# Patient Record
Sex: FEMALE | Race: WHITE | NOT HISPANIC OR LATINO | Employment: STUDENT | URBAN - METROPOLITAN AREA
[De-identification: names, ages, dates, MRNs, and addresses within clinical notes are randomized per-mention and may not be internally consistent; named-entity substitution may affect disease eponyms.]

---

## 2017-02-10 RX ORDER — CEFDINIR 300 MG/1
300 CAPSULE ORAL EVERY 12 HOURS
COMMUNITY
Start: 2016-12-26 | End: 2017-02-11

## 2017-02-10 RX ORDER — MELOXICAM 7.5 MG/1
7.5 TABLET ORAL DAILY
COMMUNITY
Start: 2016-11-07 | End: 2017-02-11

## 2017-02-10 RX ORDER — FEXOFENADINE HCL AND PSEUDOEPHEDRINE HCI 60; 120 MG/1; MG/1
1 TABLET, EXTENDED RELEASE ORAL 2 TIMES DAILY
COMMUNITY
Start: 2016-12-26 | End: 2017-02-11

## 2017-02-11 ENCOUNTER — OFFICE VISIT (OUTPATIENT)
Dept: NEUROSURGERY | Facility: CLINIC | Age: 22
End: 2017-02-11

## 2017-02-11 VITALS — BODY MASS INDEX: 20.48 KG/M2 | WEIGHT: 127.4 LBS | HEIGHT: 66 IN | TEMPERATURE: 98.4 F

## 2017-02-11 DIAGNOSIS — M50.30 DEGENERATIVE DISC DISEASE, CERVICAL: ICD-10-CM

## 2017-02-11 DIAGNOSIS — G56.21 ULNAR NEUROPATHY OF RIGHT UPPER EXTREMITY: Primary | ICD-10-CM

## 2017-02-11 DIAGNOSIS — M50.30 BULGING OF CERVICAL INTERVERTEBRAL DISC: ICD-10-CM

## 2017-02-11 PROCEDURE — 99243 OFF/OP CNSLTJ NEW/EST LOW 30: CPT | Performed by: NEUROLOGICAL SURGERY

## 2017-02-11 NOTE — PROGRESS NOTES
Patient: Mikaela Fleming  : 1995    Primary Care Provider: Chichi Vo MD    Requesting Provider: As above        History    Chief Complaint: Right arm, hand, and neck pain.    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  This tends to vary between numbness, tingling, and pain.  She has some neck discomfort.  She did 2 weeks of physical therapy which wasn't particularly productive.  She is worse with writing or overuse of the right upper extremity.  Of note she did suffer a fracture near the elbow 14 years ago.  She has no radicular symptoms.  She does have some weakness in her .    Review of Systems   Constitutional: Positive for appetite change. Negative for activity change, chills, diaphoresis, fatigue, fever and unexpected weight change.   HENT: Negative for congestion, dental problem, drooling, ear discharge, ear pain, facial swelling, hearing loss, mouth sores, nosebleeds, postnasal drip, rhinorrhea, sinus pressure, sneezing, sore throat, tinnitus, trouble swallowing and voice change.    Eyes: Negative for photophobia, pain, discharge, redness, itching and visual disturbance.   Respiratory: Negative for apnea, cough, choking, chest tightness, shortness of breath, wheezing and stridor.    Cardiovascular: Negative for chest pain, palpitations and leg swelling.   Gastrointestinal: Positive for abdominal pain. Negative for abdominal distention, anal bleeding, blood in stool, constipation, diarrhea, nausea, rectal pain and vomiting.   Endocrine: Negative for cold intolerance, heat intolerance, polydipsia, polyphagia and polyuria.   Genitourinary: Negative for decreased urine volume, difficulty urinating, dysuria, enuresis, flank pain, frequency, genital sores, hematuria and urgency.   Musculoskeletal: Positive for back pain, neck pain  "and neck stiffness. Negative for arthralgias, gait problem, joint swelling and myalgias.   Skin: Negative for color change, pallor, rash and wound.   Allergic/Immunologic: Negative for environmental allergies, food allergies and immunocompromised state.   Neurological: Positive for weakness and numbness. Negative for dizziness, tremors, seizures, syncope, facial asymmetry, speech difficulty, light-headedness and headaches.   Hematological: Negative for adenopathy. Does not bruise/bleed easily.   Psychiatric/Behavioral: Negative for agitation, behavioral problems, confusion, decreased concentration, dysphoric mood, hallucinations, self-injury, sleep disturbance and suicidal ideas. The patient is not nervous/anxious and is not hyperactive.    All other systems reviewed and are negative.      The patient's past medical history, past surgical history, family history, and social history have been reviewed at length in the electronic medical record.    Physical Exam:   Visit Vitals   • Temp 98.4 °F (36.9 °C)   • Ht 66\" (167.6 cm)   • Wt 127 lb 6.4 oz (57.8 kg)   • BMI 20.56 kg/m2     CONSTITUTIONAL: Patient is well-nourished, pleasant and appears stated age.  CV: Heart regular rate and rhythm without murmur, rub, or gallop.  PULMONARY: Lungs are clear to ascultation.  MUSCULOSKELETAL:  Neck tenderness to palpation is not observed.   ROM in neck is normal.  There is tenderness over the right ulnar groove.  NEUROLOGICAL:  Orientation, memory, attention span, language function, and cognition have been examined and are intact.  Strength is intact in the upper and lower extremities to direct testing.  Muscle tone is normal throughout.  Station and gait are normal.  Sensation is intact to light touch testing throughout.  Deep tendon reflexes are 2+ and symmetrical.  Treasure's Sign is negative bilaterally. No clonus is elicited.  Coordination is intact.      Medical Decision Making    Data Review:   Cervical MRI study " demonstrates some modest degenerative disc disease.  There is a trace amount of disc bulging at C5-6.    Diagnosis:   I suspect that the patient has an ulnar neuropathy.  This potentially could be related to the prior fracture that she suffered a number of years ago.  I do not see evidence of an active cervical process.    Treatment Options:   I have referred the patient for electrodiagnostic studies of the right upper extremity.  She will follow up thereafter.       Diagnosis Plan   1. Ulnar neuropathy of right upper extremity  EMG Right Arm    Nerve Conduction Test Right Arm   2. Bulging of cervical intervertebral disc     3. Degenerative disc disease, cervical         Scribed for John Rosario MD by Billie Medel CMA on 02/11/2017 at 10:39 AM    I, Dr. Rosario, personally performed the services described in the documentation, as scribed in my presence, and it is both accurate and complete.

## 2017-03-03 ENCOUNTER — OFFICE VISIT (OUTPATIENT)
Dept: NEUROSURGERY | Facility: CLINIC | Age: 22
End: 2017-03-03

## 2017-03-03 VITALS — TEMPERATURE: 98.2 F | BODY MASS INDEX: 20.25 KG/M2 | HEIGHT: 66 IN | WEIGHT: 126 LBS

## 2017-03-03 DIAGNOSIS — G56.21 ULNAR NEUROPATHY OF RIGHT UPPER EXTREMITY: Primary | ICD-10-CM

## 2017-03-03 DIAGNOSIS — M50.30 BULGING OF CERVICAL INTERVERTEBRAL DISC: ICD-10-CM

## 2017-03-03 DIAGNOSIS — M50.30 DEGENERATIVE DISC DISEASE, CERVICAL: ICD-10-CM

## 2017-03-03 PROCEDURE — 99213 OFFICE O/P EST LOW 20 MIN: CPT | Performed by: NEUROLOGICAL SURGERY

## 2017-03-03 NOTE — PROGRESS NOTES
Patient: Mikaela Fleming  : 1995    Primary Care Provider: Chichi Vo MD    Requesting Provider: As above      History    Chief Complaint: Right arm, hand, and neck pain.    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  She has neck discomfort as well.  She did suffer a fracture near her right elbow about 14 years ago.  Her symptoms are largely unchanged.  She reports no radicular symptoms.    Review of Systems   Constitutional: Positive for appetite change. Negative for activity change, chills, diaphoresis, fatigue, fever and unexpected weight change.   HENT: Negative for congestion, dental problem, drooling, ear discharge, ear pain, facial swelling, hearing loss, mouth sores, nosebleeds, postnasal drip, rhinorrhea, sinus pressure, sneezing, sore throat, tinnitus, trouble swallowing and voice change.    Eyes: Negative for photophobia, pain, discharge, redness, itching and visual disturbance.   Respiratory: Negative for apnea, cough, choking, chest tightness, shortness of breath, wheezing and stridor.    Cardiovascular: Negative for chest pain, palpitations and leg swelling.   Gastrointestinal: Positive for abdominal pain. Negative for abdominal distention, anal bleeding, blood in stool, constipation, diarrhea, nausea, rectal pain and vomiting.   Endocrine: Negative for cold intolerance, heat intolerance, polydipsia, polyphagia and polyuria.   Genitourinary: Negative for decreased urine volume, difficulty urinating, dysuria, enuresis, flank pain, frequency, genital sores, hematuria and urgency.   Musculoskeletal: Positive for back pain, neck pain and neck stiffness. Negative for arthralgias, gait problem, joint swelling and myalgias.   Skin: Negative for color change, pallor, rash and wound.   Allergic/Immunologic: Negative for  "environmental allergies, food allergies and immunocompromised state.   Neurological: Positive for weakness and numbness. Negative for dizziness, tremors, seizures, syncope, facial asymmetry, speech difficulty, light-headedness and headaches.   Hematological: Negative for adenopathy. Does not bruise/bleed easily.   Psychiatric/Behavioral: Negative for agitation, behavioral problems, confusion, decreased concentration, dysphoric mood, hallucinations, self-injury, sleep disturbance and suicidal ideas. The patient is not nervous/anxious and is not hyperactive.    All other systems reviewed and are negative.      The patient's past medical history, past surgical history, family history, and social history have been reviewed at length in the electronic medical record.    Physical Exam:   Visit Vitals   • Temp 98.2 °F (36.8 °C)   • Ht 66\" (167.6 cm)   • Wt 126 lb (57.2 kg)   • BMI 20.34 kg/m2     MUSCULOSKELETAL:  Neck tenderness to palpation is not observed.   ROM in neck is normal.  NEUROLOGICAL:  Strength is intact in the upper and lower extremities to direct testing.  Muscle tone is normal throughout.  Station and gait are normal.    Medical Decision Making    Data Review:   Electrodiagnostic studies demonstrate a borderline right ulnar neuropathy at the elbow.  There is no evidence of a cervical radiculopathy.    Diagnosis:   1.  Mild right ulnar neuropathy at the elbow.  2.  Cervical degenerative disc disease.    Treatment Options:   I believe that most of her complaints are related to the mild ulnar neuropathy.  I have recommended that she avoid trauma or pressure to the ulnar groove.  If her symptoms progress over time then I will be happy to reassess her.  Her symptoms at this point in time are not severe enough to warrant surgical intervention.       Diagnosis Plan   1. Ulnar neuropathy of right upper extremity     2. Bulging of cervical intervertebral disc     3. Degenerative disc disease, cervical   "           I, Dr. Rosario, personally performed the services described in the documentation, as scribed in my presence, and it is both accurate and complete.  Scribed for John Rosario MD by Kendrick Simpson CMA on 03/03/2017 at 2:02 PM

## 2021-03-23 ENCOUNTER — BULK ORDERING (OUTPATIENT)
Dept: CASE MANAGEMENT | Facility: OTHER | Age: 26
End: 2021-03-23

## 2021-03-23 DIAGNOSIS — Z23 IMMUNIZATION DUE: ICD-10-CM
